# Patient Record
(demographics unavailable — no encounter records)

---

## 2024-11-18 NOTE — ASSESSMENT
[FreeTextEntry1] : //  Hyperlipidemia, Goal LDL<100  -Will check labs  -Advised decrease greasy, fatty foods, increase exercise, fiber intake  -Elevated cholesterol has been linked to increase in cardiovascular even such as heart attack, stroke, peripheral artery disease and death  Healthcare Maintenance -Advise Yearly Skin cancer screening with Dermatologist  -Advise Yearly Eye exam with Ophthalmologist -Advise Yearly Dental exam -Educated of the importance of Healthy diet, such as Mediterranean Diet and Exercise, such as walking >20 minutes a day and increasing gradually as tolerated  Immunizations -Flu vaccine -up to date  -Covid vaccine  -Discussed shingles vaccine (shingrix), advised to verify with insurance if its covered through medical or prescription plan  Preventative screening  -advised to get PAP for cervical cancer screening -up to date  -advised to get mammogram for breast cancer screening -up to date  -advised to get bone density for osteoporosis screening -follows with GYN  -advised to get colonoscopy for colon cancer screening -up to date

## 2024-11-18 NOTE — HEALTH RISK ASSESSMENT
[Good] : ~his/her~  mood as  good [No] : In the past 12 months have you used drugs other than those required for medical reasons? No [No falls in past year] : Patient reported no falls in the past year [0] : 2) Feeling down, depressed, or hopeless: Not at all (0) [PHQ-2 Negative - No further assessment needed] : PHQ-2 Negative - No further assessment needed [Never] : Never [Fully functional (bathing, dressing, toileting, transferring, walking, feeding)] : Fully functional (bathing, dressing, toileting, transferring, walking, feeding) [Fully functional (using the telephone, shopping, preparing meals, housekeeping, doing laundry, using] : Fully functional and needs no help or supervision to perform IADLs (using the telephone, shopping, preparing meals, housekeeping, doing laundry, using transportation, managing medications and managing finances) [YHQ2Cweod] : 0 [Change in mental status noted] : No change in mental status noted [Reports changes in hearing] : Reports no changes in hearing [Reports changes in vision] : Reports no changes in vision [MammogramDate] : 1.2024 [ColonoscopyDate] : 2021

## 2024-11-18 NOTE — HISTORY OF PRESENT ILLNESS
[de-identified] : 51 year old female borderline hyperlipidemia presents for annual exam.  Overall doing well.  No acute issues. Has been working on healthy diet and exercise lower cholesterol levels  female partner employed non-smoker

## 2025-06-18 NOTE — ASSESSMENT
[FreeTextEntry1] : 1 - check urine  2- dicussed thismay be related to UTI  3- as resolved - no fureth eval at this time - however if recurs with utis - discussed to evla for fistual shaggy with hx of pelvic sx / RT or truama

## 2025-06-18 NOTE — HISTORY OF PRESENT ILLNESS
[FreeTextEntry1] : Last seen Nov 2022: patient with hx of microhematuai seen 2019- 19 red cells, Oct 2022 - 11 red cells hx of tobacco use -none since 2020, father with hx of bladder cancer JASMINE ( oct 2022) : normal denies luts, no INC or pad use, no issues with sexual activity, bowel habits OK no vag bleedng - menopausal > one year here for further eval - cystoscopy - to complete eval - agrees to do today - NORMAL  Nov 2024: creat 0.87, ua Neg for microhematuria.  Here for eval of " bubbles in urine ; for few days- and resolved on own no dysuria , hoding urine and voided wht forceful flow, no increased freqency  less water , no recent travel, no change in diet , meds or vitamins ,  reported more EtOH  bowel habits , not related to sex

## 2025-07-02 NOTE — HISTORY OF PRESENT ILLNESS
[de-identified] : For the last month and a half patient has had intermittent sore throats on the left side. She reports that she does have reflux and takes meds every night. No issues with eating, drinking, swallowing or changes in voice  Sometimes it feels dry, other times its very painful.

## 2025-07-02 NOTE — PHYSICAL EXAM
[Midline] : trachea located in midline position [Normal] : no rashes [de-identified] : slightly edematous and erythematous

## 2025-07-02 NOTE — ASSESSMENT
[FreeTextEntry1] : Patient with a history of reflux medication intermittently has been getting some recurrent sore throats predominantly on the left side on examination the left tonsil is slightly more enlarged than the right culture was taken fiberoptic evaluation of her larynx did show evidence of some lingual tonsillitis put her on a Z-Yakov fully expecting this to resolve her symptoms and will get back to her to see once we get the results of the culture.

## 2025-07-02 NOTE — END OF VISIT
[FreeTextEntry3] : I personally saw and examined CASEY STEIN in detail this visit today. I personally reviewed the HPI, PMH, FH, SH, ROS, and medications/allergies. I have spoken to KHADRA Siegel regarding the history and have personally determined the assessment and plan of care and documented this myself. I was present and participated in all key portions of the encounter and all procedures noted above. I have made changes in the body of the note where appropriate.